# Patient Record
(demographics unavailable — no encounter records)

---

## 2024-10-07 NOTE — PHYSICAL EXAM
[Alert] : alert [Acute Distress] : no acute distress [Normocephalic] : normocephalic [Flat Open Anterior [place]] : flat open anterior fontanelle [Flat Open Posterior [place]] : flat open posterior fontanelle [PERRL] : PERRL [Red Reflex Bilateral] : red reflex bilateral [Normally Placed Ears] : normally placed ears [Auricles Well Formed] : auricles well formed [Clear Tympanic membranes] : clear tympanic membranes [Light reflex present] : light reflex present [Bony landmarks visible] : bony landmarks visible [Discharge] : no discharge [Nares Patent] : nares patent [Palate Intact] : palate intact [Uvula Midline] : uvula midline [Supple, full passive range of motion] : supple, full passive range of motion [Palpable Masses] : no palpable masses [Symmetric Chest Rise] : symmetric chest rise [Clear to Auscultation Bilaterally] : clear to auscultation bilaterally [Regular Rate and Rhythm] : regular rate and rhythm [S1, S2 present] : S1, S2 present [Murmurs] : no murmurs [+2 Femoral Pulses] : +2 femoral pulses [Soft] : soft [Tender] : nontender [Distended] : not distended [Bowel Sounds] : bowel sounds present [Hepatomegaly] : no hepatomegaly [Splenomegaly] : no splenomegaly [Normal external genitailia] : normal external genitalia [Clitoromegaly] : no clitoromegaly [Patent Vagina] : vagina patent [Normally Placed] : normally placed [No Abnormal Lymph Nodes Palpated] : no abnormal lymph nodes palpated [Peterson-Ortolani] : negative Peterson-Ortolani [Symmetric Flexed Extremities] : symmetric flexed extremities [Spinal Dimple] : no spinal dimple [Tuft of Hair] : no tuft of hair [Startle Reflex] : startle reflex present [Suck Reflex] : suck reflex present [Rooting] : rooting reflex present [Palmar Grasp] : palmar grasp reflex present [Plantar Grasp] : plantar grasp reflex present [Symmetric Amelia] : symmetric [place] [Rash and/or lesion present] : no rash/lesion

## 2024-10-07 NOTE — HISTORY OF PRESENT ILLNESS
[Formula ___ oz/feed] : [unfilled] oz of formula per feed [Hours between feeds ___] : Child is fed every [unfilled] hours [Normal] : Normal [Frequency of stools: ___] : Frequency of stools: [unfilled]  stools [per day] : per day. [In Bassinet/Crib] : sleeps in bassinet/crib [On back] : sleeps on back [No] : No cigarette smoke exposure [Water heater temperature set at <120 degrees F] : Water heater temperature set at <120 degrees F [Rear facing car seat in back seat] : Rear facing car seat in back seat [Carbon Monoxide Detectors] : Carbon monoxide detectors at home [Smoke Detectors] : Smoke detectors at home. [FreeTextEntry8] : Stools are soft [FreeTextEntry1] :   2 M old here for well visit  No interval illness Pt doing well; no developmental concerns Starting to track more, having more of a social smile, completing tummy time

## 2024-10-07 NOTE — DISCUSSION/SUMMARY
[Normal Growth] : growth [Normal Development] : development  [No Elimination Concerns] : elimination [Continue Regimen] : feeding [Normal Sleep Pattern] : sleep [Anticipatory Guidance Given] : Anticipatory guidance addressed as per the history of present illness section [Parental (Maternal) Well-Being] : parental (maternal) well-being [Infant-Family Synchrony] : infant-family synchrony [Nutritional Adequacy] : nutritional adequacy [Infant Behavior] : infant behavior [Safety] : safety [Parental Concerns Addressed] : Parental concerns addressed [] : The components of the vaccine(s) to be administered today are listed in the plan of care. The disease(s) for which the vaccine(s) are intended to prevent and the risks have been discussed with the caretaker.  The risks are also included in the appropriate vaccination information statements which have been provided to the patient's caregiver.  The caregiver has given consent to vaccinate. [FreeTextEntry1] : Risks and benefits discussed regarding the vaccines needed and the protection provided. Mother consenting to DTap and Rotateq; will return for PCV, Hib, and IPV. RSV also discussed but declined at this visit Age-appropriate anticipatory guidance given as well as guidance regarding immunizations Infant acetaminophen dosing sheet provided Continue feeding regimen w/formula, recommend iron-fortified formulations, 2-4 oz every 3-4 hrs.  When in car, patient should be in rear-facing car seat in back seat.  Put baby to sleep on back, in own crib with no loose or soft bedding.  Help baby to maintain sleep and feeding routines.  Continue tummy time when awake Return for 4M well visit, sooner for vaccinations and as needed

## 2024-10-07 NOTE — DEVELOPMENTAL MILESTONES
[None] : none [Vocalizes with simple cooing] : vocalizes with simple cooing [Lifts head and chest in prone] : lifts head and chest in prone [Opens and shuts hands] : opens and shuts hands [FreeTextEntry1] : Starting social smile [Passed] : passed [FreeTextEntry2] : 0

## 2024-10-21 NOTE — DISCUSSION/SUMMARY
[FreeTextEntry1] : Hib given by LPN w/out adverse event. Reviewed with parents vaccines recommended including Prevnar and Prevnar and offered at today's visit. Parents to return to complete remaining vaccines.  [] : The components of the vaccine(s) to be administered today are listed in the plan of care. The disease(s) for which the vaccine(s) are intended to prevent and the risks have been discussed with the caretaker.  The risks are also included in the appropriate vaccination information statements which have been provided to the patient's caregiver.  The caregiver has given consent to vaccinate.

## 2024-10-21 NOTE — HISTORY OF PRESENT ILLNESS
[Hib] : Hib [FreeTextEntry1] :  2M here for Hib as pt completing delayed vaccine schedule. No concerns.

## 2024-11-04 NOTE — DISCUSSION/SUMMARY
[FreeTextEntry1] :  Prevnar given by LPN w/out adverse event Pt due for IPV, will return as parents preferring to complete one vaccine at a time [] : The components of the vaccine(s) to be administered today are listed in the plan of care. The disease(s) for which the vaccine(s) are intended to prevent and the risks have been discussed with the caretaker.  The risks are also included in the appropriate vaccination information statements which have been provided to the patient's caregiver.  The caregiver has given consent to vaccinate.

## 2024-11-04 NOTE — HISTORY OF PRESENT ILLNESS
[PCV 20] : PCV 20 [FreeTextEntry1] :  3M here for vaccination, following delayed schedule, requesting PCV-20

## 2024-12-16 NOTE — PHYSICAL EXAM
[Alert] : alert [Normocephalic] : normocephalic [Flat Open Anterior [place]] : flat open anterior fontanelle [Red Reflex] : red reflex bilateral [PERRL] : PERRL [Normally Placed Ears] : normally placed ears [Auricles Well Formed] : auricles well formed [Clear Tympanic membranes] : clear tympanic membranes [Light reflex present] : light reflex present [Bony landmarks visible] : bony landmarks visible [Nares Patent] : nares patent [Palate Intact] : palate intact [Uvula Midline] : uvula midline [Symmetric Chest Rise] : symmetric chest rise [Clear to Auscultation Bilaterally] : clear to auscultation bilaterally [Regular Rate and Rhythm] : regular rate and rhythm [S1, S2 present] : S1, S2 present [+2 Femoral Pulses] : (+) 2 femoral pulses [Soft] : soft [Bowel Sounds] : bowel sounds present [Normal External Genitalia] : normal external genitalia [Normal Vaginal Introitus] : normal vaginal introitus [Patent] : patent [Normally Placed] : normally placed [No Abnormal Lymph Nodes Palpated] : no abnormal lymph nodes palpated [Startle Reflex] : startle reflex present [Plantar Grasp] : plantar grasp reflex present [Symmetric Amelia] : symmetric amelia [Acute Distress] : no acute distress [Discharge] : no discharge [Palpable Masses] : no palpable masses [Murmurs] : no murmurs [Tender] : nontender [Distended] : nondistended [Hepatomegaly] : no hepatomegaly [Splenomegaly] : no splenomegaly [Clitoromegaly] : no clitoromegaly [Peterson-Ortolani] : negative Peterson-Ortolani [Allis Sign] : negative Allis sign [Spinal Dimple] : no spinal dimple [Tuft of Hair] : no tuft of hair [Rash or Lesions] : no rash/lesions

## 2024-12-16 NOTE — DISCUSSION/SUMMARY
[Normal Growth] : growth [Normal Development] : development  [No Skin Concerns] : skin [Normal Sleep Pattern] : sleep [Family Functioning] : family functioning [Nutritional Adequacy and Growth] : nutritional adequacy and growth [Infant Development] : infant development [Oral Health] : oral health [Safety] : safety [Parental Concerns Addressed] : Parental concerns addressed [] : The components of the vaccine(s) to be administered today are listed in the plan of care. The disease(s) for which the vaccine(s) are intended to prevent and the risks have been discussed with the caretaker.  The risks are also included in the appropriate vaccination information statements which have been provided to the patient's caregiver.  The caregiver has given consent to vaccinate. [FreeTextEntry1] :  Vaccines reviewed, parents consenting to Beyfortus. Will defer Pentacel, Rotateq, and PCV Cereal may be introduced using a spoon and bowl When in car, patient should be in rear-facing car seat in back seat Put baby to sleep on back, in own crib with no loose or soft bedding Lower crib mattress. Help baby to maintain sleep and feeding routines. May offer pacifier if needed. Continue tummy time when awake Age-appropriate anticipatory guidance given as well as guidance regarding immunizations Return for 6M well visit, sooner PRN

## 2024-12-16 NOTE — HISTORY OF PRESENT ILLNESS
[Parents] : parents [Formula ___ oz/feed] : [unfilled] oz of formula per feed [Normal] : Normal [Frequency of stools: ___] : Frequency of stools: [unfilled]  stools [per day] : per day. [In Bassinet/Crib] : sleeps in bassinet/crib [On back] : sleeps on back [Sleeps 12-16 hours per 24 hours (including naps)] : sleeps 12-16 hours per 24 hours (including naps) [Tummy time] : tummy time [No] : No cigarette smoke exposure [Water heater temperature set at <120 degrees F] : Water heater temperature set at <120 degrees F [Rear facing car seat in back seat] : Rear facing car seat in back seat [Carbon Monoxide Detectors] : Carbon monoxide detectors at home [Smoke Detectors] : Smoke detectors at home. [PCV 20] : PCV 20 [DTaP/IPV/Hib] : DTaP/IPV/Hib [Rotavirus] : Rotavirus [Co-sleeping] : no co-sleeping [Loose bedding, pillow, toys, and/or bumpers in crib] : no loose bedding, pillow, toys, and/or bumpers in crib [FreeTextEntry1] :  4 M here for routine visit  Pt doing well with feeds, taking formula, no excessive spitting up Regular BMs, no concerns with constipation, voiding well  No developmental concerns; rolls front to back, starting to roll back to front, smiling socially

## 2024-12-16 NOTE — DEVELOPMENTAL MILESTONES
[Normal Development] : Normal Development [Laughs aloud] : laughs aloud [Turns to voice] : turns to voice [Vocalizes with extending cooing] : vocalizes with extending cooing [Rolls over prone to supine] : rolls over prone to supine [Supports on elbows & wrists in prone] : supports on elbows and wrists in prone [Keeps hands unfisted] : keeps hands unfisted [Plays with fingers in midline] : plays with fingers in midline [Grasps objects] : grasps objects [Passed] : passed [FreeTextEntry2] : 3

## 2024-12-30 NOTE — DISCUSSION/SUMMARY
[FreeTextEntry1] :  Pentacel given by LPN w/out adverse event. Parent to return in one week for Rotateq and PCV [] : The components of the vaccine(s) to be administered today are listed in the plan of care. The disease(s) for which the vaccine(s) are intended to prevent and the risks have been discussed with the caretaker.  The risks are also included in the appropriate vaccination information statements which have been provided to the patient's caregiver.  The caregiver has given consent to vaccinate.

## 2025-02-21 NOTE — HISTORY OF PRESENT ILLNESS
[Parents] : parents [Formula ___ oz in 24hrs] : [unfilled] oz of formula in 24 hours [Fruits] : fruits [Vegetables] : vegetables [Normal] : Normal [In Bassinet/Crib] : sleeps in bassinet/crib [On back] : sleeps on back [Sleeps 12-16 hours per 24 hours (including naps)] : sleeps 12-16 hours per 24 hours (including naps) [Tummy time] : tummy time [No] : No cigarette smoke exposure [Rear facing car seat in back seat] : Rear facing car seat in back seat [Smoke Detectors] : Smoke detectors at home. [Loose bedding, pillow, toys, and/or bumpers in crib] : no loose bedding, pillow, toys, and/or bumpers in crib [FreeTextEntry1] :  6 M here for routine visit  Pt doing well with feeds, taking formula, no excessive spitting up. Started some solids, not very interested in solids yet, no reactions noted.  Regular BMs, no concerns with constipation, voiding well  No developmental concerns; rolls front to back and back to front, responding to name, babbling

## 2025-02-21 NOTE — DISCUSSION/SUMMARY
[Normal Growth] : growth [Normal Development] : development [No Elimination Concerns] : elimination [No Feeding Concerns] : feeding [Normal Sleep Pattern] : sleep [Family Functioning] : family functioning [Nutrition and Feeding] : nutrition and feeding [Infant Development] : infant development [Oral Health] : oral health [Safety] : safety [Parental Concerns Addressed] : Parental concerns addressed [] : The components of the vaccine(s) to be administered today are listed in the plan of care. The disease(s) for which the vaccine(s) are intended to prevent and the risks have been discussed with the caretaker.  The risks are also included in the appropriate vaccination information statements which have been provided to the patient's caregiver.  The caregiver has given consent to vaccinate. [FreeTextEntry1] : Pentacel and Rotateq given; anticipatory guidance given re: vaccines. Parent wanted to defer Prevnar and will return in 1-2 weeks for dose. Flu vaccine offered and declined Introduce allergenic foods such as eggs, nuts, peanut butter, and seafood.  Incorporate up to 4 oz of fluorinated water daily in a sippy cup  When teeth erupt wipe daily with washcloth Lead screening reviewed, no concerning risk factors  When in car, patient should be in rear-facing car seat in back seat Put baby to sleep on back, in own crib with no loose or soft bedding Help baby to maintain sleep and feeding routines Continue tummy time when awake Ensure home is safe since baby is now more mobile.  Read aloud to baby. Age-appropriate guidance given Return for 9M well visit; sooner PRN. Pt to return in 2 weeks for Prevnar

## 2025-02-21 NOTE — DEVELOPMENTAL MILESTONES
[Normal Development] : Normal Development [Begins to turn when name called] : begins to turn when name called [Babbles] : babbles [Rolls over prone to supine] : rolls over prone to supine [Sits briefly without support] : sits briefly without support [Reaches for object and transfers] : reaches for object and transfers [[place] small object on surface] : bangs small object on surface [Passed] : passed [FreeTextEntry2] : 2

## 2025-02-21 NOTE — PHYSICAL EXAM
[Alert] : alert [Normocephalic] : normocephalic [Flat Open Anterior [place]] : flat open anterior fontanelle [Red Reflex] : red reflex bilateral [PERRL] : PERRL [Normally Placed Ears] : normally placed ears [Auricles Well Formed] : auricles well formed [Clear Tympanic membranes] : clear tympanic membranes [Light reflex present] : light reflex present [Bony landmarks visible] : bony landmarks visible [Nares Patent] : nares patent [Palate Intact] : palate intact [Uvula Midline] : uvula midline [Tooth Eruption] : tooth eruption [Supple, full passive range of motion] : supple, full passive range of motion [Symmetric Chest Rise] : symmetric chest rise [Clear to Auscultation Bilaterally] : clear to auscultation bilaterally [Regular Rate and Rhythm] : regular rate and rhythm [S1, S2 present] : S1, S2 present [+2 Femoral Pulses] : (+) 2 femoral pulses [Soft] : soft [Bowel Sounds] : bowel sounds present [Normal External Genitalia] : normal external genitalia [Normal Vaginal Introitus] : normal vaginal introitus [Patent] : patent [Normally Placed] : normally placed [No Abnormal Lymph Nodes Palpated] : no abnormal lymph nodes palpated [Symmetric Buttocks Creases] : symmetric buttocks creases [Plantar Grasp] : plantar grasp reflex present [Cranial Nerves Grossly Intact] : cranial nerves grossly intact [Acute Distress] : no acute distress [Discharge] : no discharge [Palpable Masses] : no palpable masses [Murmurs] : no murmurs [Tender] : nontender [Distended] : nondistended [Hepatomegaly] : no hepatomegaly [Splenomegaly] : no splenomegaly [Clitoromegaly] : no clitoromegaly [Peterson-Ortolani] : negative Peterson-Ortolani [Spinal Dimple] : no spinal dimple [Tuft of Hair] : no tuft of hair [Rash or Lesions] : no rash/lesions

## 2025-03-06 NOTE — DISCUSSION/SUMMARY
[FreeTextEntry1] :  PCV given by LPN w/out adverse event [] : The components of the vaccine(s) to be administered today are listed in the plan of care. The disease(s) for which the vaccine(s) are intended to prevent and the risks have been discussed with the caretaker.  The risks are also included in the appropriate vaccination information statements which have been provided to the patient's caregiver.  The caregiver has given consent to vaccinate.

## 2025-03-06 NOTE — HISTORY OF PRESENT ILLNESS
[PCV 20] : PCV 20 [FreeTextEntry1] :  7 M here w/delayed immunizations, receiving PCV today, no concerns

## 2025-03-20 NOTE — HISTORY OF PRESENT ILLNESS
[DTaP/IPV/Hib] : DTaP/IPV/Hib [Rotavirus] : Rotavirus [FreeTextEntry1] :  7 M here for vaccine catch up, Pentacel and Rota, with no concerns

## 2025-05-02 NOTE — HISTORY OF PRESENT ILLNESS
[Parents] : parents [Fruit] : fruit [Vegetables] : vegetables [Eggs] : eggs [Peanut] : peanut [Normal] : Normal [Frequency of stools: ___] : Frequency of stools: [unfilled]  stools [per day] : per day. [In Crib] : sleeps in crib [None] : Primary Fluoride Source: None [No] : No exposure to electronic nicotine device [Rear facing car seat in  back seat] : Rear facing car seat in  back seat [Smoke Detectors] : Smoke detectors [Loose bedding, pillow, toys, and/or bumpers in crib] : no loose bedding, pillow, toys, and/or bumpers in crib [de-identified] : Formula plus addition of solids [FreeTextEntry1] :  9 M here for routine visit  Pt doing well with feeds, taking formula, no excessive spitting up. Increasing intake of solids, has introduced some allergens Regular BMs, no concerns with constipation, voiding well  No developmental concerns; babbling, saying "Mama, Terrance" nonspecifically, crawling, developing pincer grasp

## 2025-05-02 NOTE — PHYSICAL EXAM
[Alert] : alert [Normocephalic] : normocephalic [Flat Open Anterior [place]] : flat open anterior fontanelle [Red Reflex] : red reflex bilateral [PERRL] : PERRL [Normally Placed Ears] : normally placed ears [Auricles Well Formed] : auricles well formed [Clear Tympanic membranes] : clear tympanic membranes [Light reflex present] : light reflex present [Bony landmarks visible] : bony landmarks visible [Nares Patent] : nares patent [Palate Intact] : palate intact [Uvula Midline] : uvula midline [Tooth Eruption] : tooth eruption [Supple, full passive range of motion] : supple, full passive range of motion [Symmetric Chest Rise] : symmetric chest rise [Clear to Auscultation Bilaterally] : clear to auscultation bilaterally [Regular Rate and Rhythm] : regular rate and rhythm [S1, S2 present] : S1, S2 present [+2 Femoral Pulses] : (+) 2 femoral pulses [Soft] : soft [Bowel Sounds] : bowel sounds present [Normal External Genitalia] : normal external genitalia [Normal Vaginal Introitus] : normal vaginal introitus [No Abnormal Lymph Nodes Palpated] : no abnormal lymph nodes palpated [Symmetric abduction and rotation of hips] : symmetric abduction and rotation of hips [Straight] : straight [Cranial Nerves Grossly Intact] : cranial nerves grossly intact [Acute Distress] : no acute distress [Excessive Tearing] : no excessive tearing [Discharge] : no discharge [Palpable Masses] : no palpable masses [Murmurs] : no murmurs [Tender] : nontender [Distended] : nondistended [Hepatomegaly] : no hepatomegaly [Splenomegaly] : no splenomegaly [Clitoromegaly] : no clitoromegaly [Allis Sign] : negative Allis sign [Rash or Lesions] : no rash/lesions

## 2025-05-02 NOTE — DEVELOPMENTAL MILESTONES
[Uses basic gestures] : uses basic gestures [Says "Terrance" or "Mama"] : says "Terrance" or "Mama" nonspecifically [Sits well without support] : sits well without support [Crawls] : crawls [Picks up small objects with 3 fingers] : picks up small objects with 3 fingers and thumb [Yes] : Completed. [FreeTextEntry1] : No concerning findings

## 2025-05-02 NOTE — DISCUSSION/SUMMARY
[Normal Growth] : growth [Normal Development] : development [Term Infant] : Term infant [Parent/Guardian] : parent/guardian [] : The components of the vaccine(s) to be administered today are listed in the plan of care. The disease(s) for which the vaccine(s) are intended to prevent and the risks have been discussed with the caretaker.  The risks are also included in the appropriate vaccination information statements which have been provided to the patient's caregiver.  The caregiver has given consent to vaccinate. [FreeTextEntry1] :   Hep B and Prevnar given; anticipatory guidance re: vaccines provided Age-appropriate anticipatory guidance given  SWYC reviewed, no developmental concerns Continue allergenic foods such as eggs, nuts, peanut butter, and seafood Continue formula feeds, introduce single-ingredient foods rich in iron, one at a time.  Incorporate up to 4 oz of fluorinated water daily in a sippy cup When teeth erupt wipe daily with washcloth When in car, patient should be in rear-facing car seat in back seat Put baby to sleep on back, in own crib with no loose or soft bedding Help baby to maintain sleep and feeding routines Ensure home is safe since baby is now more mobile.  Read aloud to baby. Age-appropriate guidance given Return for 12 M checkup, sooner PRN

## 2025-05-02 NOTE — HISTORY OF PRESENT ILLNESS
[Parents] : parents [Fruit] : fruit [Vegetables] : vegetables [Eggs] : eggs [Peanut] : peanut [Normal] : Normal [Frequency of stools: ___] : Frequency of stools: [unfilled]  stools [per day] : per day. [In Crib] : sleeps in crib [None] : Primary Fluoride Source: None [No] : No exposure to electronic nicotine device [Rear facing car seat in  back seat] : Rear facing car seat in  back seat [Smoke Detectors] : Smoke detectors [Loose bedding, pillow, toys, and/or bumpers in crib] : no loose bedding, pillow, toys, and/or bumpers in crib [de-identified] : Formula plus addition of solids [FreeTextEntry1] :  9 M here for routine visit  Pt doing well with feeds, taking formula, no excessive spitting up. Increasing intake of solids, has introduced some allergens Regular BMs, no concerns with constipation, voiding well  No developmental concerns; babbling, saying "Mama, Terrance" nonspecifically, crawling, developing pincer grasp